# Patient Record
Sex: FEMALE | Race: BLACK OR AFRICAN AMERICAN | NOT HISPANIC OR LATINO | Employment: FULL TIME | URBAN - METROPOLITAN AREA
[De-identification: names, ages, dates, MRNs, and addresses within clinical notes are randomized per-mention and may not be internally consistent; named-entity substitution may affect disease eponyms.]

---

## 2019-06-20 PROBLEM — N87.1 DYSPLASIA OF CERVIX, HIGH GRADE CIN 2: Status: ACTIVE | Noted: 2018-06-18

## 2019-10-16 PROBLEM — Z79.899 HIGH RISK MEDICATION USE: Status: ACTIVE | Noted: 2019-10-16

## 2019-10-16 PROBLEM — R73.9 HYPERGLYCEMIA: Status: ACTIVE | Noted: 2019-10-16

## 2020-01-14 PROBLEM — M05.9 RHEUMATOID ARTHRITIS WITH POSITIVE RHEUMATOID FACTOR: Status: ACTIVE | Noted: 2020-01-14

## 2020-05-01 PROBLEM — E11.65 TYPE 2 DIABETES MELLITUS WITH HYPERGLYCEMIA, WITHOUT LONG-TERM CURRENT USE OF INSULIN: Status: ACTIVE | Noted: 2020-05-01

## 2021-05-12 ENCOUNTER — PATIENT MESSAGE (OUTPATIENT)
Dept: RESEARCH | Facility: HOSPITAL | Age: 56
End: 2021-05-12

## 2021-06-22 PROBLEM — E11.9 WELL CONTROLLED DIABETES MELLITUS: Chronic | Status: ACTIVE | Noted: 2021-06-22

## 2021-06-22 PROBLEM — I10 ESSENTIAL HYPERTENSION: Chronic | Status: ACTIVE | Noted: 2021-06-22

## 2021-06-22 PROBLEM — E78.5 HYPERLIPIDEMIA: Chronic | Status: ACTIVE | Noted: 2021-06-22

## 2021-09-27 PROBLEM — Z20.822 SUSPECTED COVID-19 VIRUS INFECTION: Status: ACTIVE | Noted: 2021-09-27

## 2021-09-27 PROBLEM — A08.39 GASTROENTERITIS DUE TO COVID-19 VIRUS: Status: ACTIVE | Noted: 2021-09-27

## 2021-09-27 PROBLEM — J96.01 ACUTE HYPOXEMIC RESPIRATORY FAILURE: Status: ACTIVE | Noted: 2021-09-27

## 2021-09-27 PROBLEM — U07.1 GASTROENTERITIS DUE TO COVID-19 VIRUS: Status: ACTIVE | Noted: 2021-09-27

## 2021-09-27 PROBLEM — N17.9 AKI (ACUTE KIDNEY INJURY): Status: ACTIVE | Noted: 2021-09-27

## 2021-09-28 PROBLEM — I95.9 HYPOTENSION: Status: ACTIVE | Noted: 2021-09-28

## 2021-09-29 ENCOUNTER — PATIENT MESSAGE (OUTPATIENT)
Dept: ADMINISTRATIVE | Facility: CLINIC | Age: 56
End: 2021-09-29

## 2021-09-30 ENCOUNTER — NURSE TRIAGE (OUTPATIENT)
Dept: ADMINISTRATIVE | Facility: CLINIC | Age: 56
End: 2021-09-30

## 2021-10-02 ENCOUNTER — NURSE TRIAGE (OUTPATIENT)
Dept: ADMINISTRATIVE | Facility: CLINIC | Age: 56
End: 2021-10-02

## 2021-10-08 PROBLEM — E04.2 MULTIPLE THYROID NODULES: Status: ACTIVE | Noted: 2021-10-08

## 2021-10-08 PROBLEM — U07.1 ACUTE HYPOXEMIC RESPIRATORY FAILURE DUE TO COVID-19: Status: ACTIVE | Noted: 2021-09-27

## 2021-10-08 PROBLEM — U07.1 COVID-19: Status: ACTIVE | Noted: 2021-10-08

## 2021-10-09 PROBLEM — R07.81 PLEURITIC PAIN: Status: ACTIVE | Noted: 2021-10-09

## 2021-10-09 PROBLEM — R07.81 PLEURITIC PAIN: Status: RESOLVED | Noted: 2021-10-09 | Resolved: 2021-10-09

## 2021-10-11 PROBLEM — U07.1 COVID-19: Status: ACTIVE | Noted: 2021-10-11

## 2022-01-29 DIAGNOSIS — D84.9 IMMUNOSUPPRESSED STATUS: ICD-10-CM

## 2022-02-04 DIAGNOSIS — D84.9 IMMUNOSUPPRESSED STATUS: ICD-10-CM

## 2022-02-06 DIAGNOSIS — D84.9 IMMUNOSUPPRESSED STATUS: ICD-10-CM

## 2022-04-03 PROBLEM — H40.1121 PRIMARY OPEN ANGLE GLAUCOMA (POAG) OF LEFT EYE, MILD STAGE: Status: ACTIVE | Noted: 2022-04-03

## 2022-04-03 PROBLEM — H25.813 COMBINED FORM OF AGE-RELATED CATARACT, BOTH EYES: Status: ACTIVE | Noted: 2022-04-03

## 2022-04-03 PROBLEM — E11.9 TYPE 2 DIABETES MELLITUS WITHOUT COMPLICATION, WITHOUT LONG-TERM CURRENT USE OF INSULIN: Status: ACTIVE | Noted: 2020-05-01

## 2022-04-03 PROBLEM — H40.1122 PRIMARY OPEN ANGLE GLAUCOMA (POAG) OF LEFT EYE, MODERATE STAGE: Status: ACTIVE | Noted: 2022-04-03

## 2022-12-15 PROBLEM — S93.491A SPRAIN OF ANTERIOR TALOFIBULAR LIGAMENT OF RIGHT ANKLE: Status: ACTIVE | Noted: 2022-12-15

## 2023-01-11 DIAGNOSIS — E11.9 TYPE 2 DIABETES MELLITUS WITHOUT COMPLICATION: ICD-10-CM

## 2023-01-31 ENCOUNTER — PATIENT OUTREACH (OUTPATIENT)
Dept: ADMINISTRATIVE | Facility: HOSPITAL | Age: 58
End: 2023-01-31

## 2023-01-31 DIAGNOSIS — E11.9 WELL CONTROLLED DIABETES MELLITUS: Primary | Chronic | ICD-10-CM

## 2024-05-15 ENCOUNTER — TELEPHONE (OUTPATIENT)
Dept: PHARMACY | Facility: CLINIC | Age: 59
End: 2024-05-15

## 2024-08-07 ENCOUNTER — PATIENT OUTREACH (OUTPATIENT)
Dept: ADMINISTRATIVE | Facility: HOSPITAL | Age: 59
End: 2024-08-07

## 2024-08-07 DIAGNOSIS — Z12.31 BREAST CANCER SCREENING BY MAMMOGRAM: Primary | ICD-10-CM

## 2024-08-07 DIAGNOSIS — E11.9 TYPE 2 DIABETES MELLITUS WITHOUT COMPLICATION, WITHOUT LONG-TERM CURRENT USE OF INSULIN: ICD-10-CM

## 2024-08-08 PROBLEM — A08.39 GASTROENTERITIS DUE TO COVID-19 VIRUS: Status: RESOLVED | Noted: 2021-09-27 | Resolved: 2024-08-08

## 2024-08-08 PROBLEM — L30.9 DERMATITIS: Status: ACTIVE | Noted: 2024-08-08

## 2024-08-08 PROBLEM — I95.9 HYPOTENSION: Status: RESOLVED | Noted: 2021-09-28 | Resolved: 2024-08-08

## 2024-08-08 PROBLEM — N17.9 AKI (ACUTE KIDNEY INJURY): Status: RESOLVED | Noted: 2021-09-27 | Resolved: 2024-08-08

## 2024-08-08 PROBLEM — U07.1 GASTROENTERITIS DUE TO COVID-19 VIRUS: Status: RESOLVED | Noted: 2021-09-27 | Resolved: 2024-08-08

## 2024-08-08 PROBLEM — E11.9 WELL CONTROLLED DIABETES MELLITUS: Chronic | Status: RESOLVED | Noted: 2021-06-22 | Resolved: 2024-08-08

## 2024-08-08 PROBLEM — U07.1 COVID: Status: RESOLVED | Noted: 2021-10-11 | Resolved: 2024-08-08

## 2024-08-08 PROBLEM — R73.9 HYPERGLYCEMIA: Status: RESOLVED | Noted: 2019-10-16 | Resolved: 2024-08-08

## 2024-08-20 PROBLEM — H40.1111 PRIMARY OPEN ANGLE GLAUCOMA (POAG) OF RIGHT EYE, MILD STAGE: Status: ACTIVE | Noted: 2024-08-20

## 2024-11-08 ENCOUNTER — TELEPHONE (OUTPATIENT)
Dept: PHARMACY | Facility: CLINIC | Age: 59
End: 2024-11-08

## 2024-11-08 NOTE — TELEPHONE ENCOUNTER
[facility]/Population Health Chart Review & Patient Outreach Details For Medication Adherence Project    Reason for Outreach Encounter: 3rd Party payor non-compliance report ([employer], [employer], [employer], etc)  2.  Patient Outreach Method: Reviewed patient chart   3.   Medication in question: rosuvastatin 20 mg     rosuvastatin last filled 11/6/24 for 30 day supply    4.  Reviewed and or Updates Made To: Patient Chart  5. Outreach Outcomes and/or actions taken: Patient filled medication and is on track to be adherent  Additional Notes:

## 2025-02-16 ENCOUNTER — TELEPHONE (OUTPATIENT)
Dept: PHARMACY | Facility: CLINIC | Age: 60
End: 2025-02-16

## 2025-03-01 ENCOUNTER — TELEPHONE (OUTPATIENT)
Dept: PHARMACY | Facility: CLINIC | Age: 60
End: 2025-03-01

## 2025-03-01 NOTE — TELEPHONE ENCOUNTER
[facility]/Population Health Chart Review & Patient Outreach Details For Medication Adherence Project    Reason for Outreach Encounter: 3rd Party payor non-compliance report ([employer], [employer], C, etc)  2.  Patient Outreach Method: Reviewed patient chart   3.   Medication in question:    Diabetes Medications              metFORMIN (GLUCOPHAGE-XR) 500 MG ER 24hr tablet Take 2 tablets (1,000 mg total) by mouth 2 (two) times daily with meals.                 Metformin  last filled  9/10/24 for 90 day supply      4.  Reviewed and or Updates Made To: Patient Chart  5. Outreach Outcomes and/or actions taken: Sent inquiry to patient: Waiting for response  Additional Notes:

## 2025-03-18 PROBLEM — E11.9 DIABETES MELLITUS TYPE 2 WITHOUT RETINOPATHY: Status: ACTIVE | Noted: 2025-03-18

## 2025-03-24 ENCOUNTER — TELEPHONE (OUTPATIENT)
Dept: PHARMACY | Facility: CLINIC | Age: 60
End: 2025-03-24

## 2025-03-25 NOTE — TELEPHONE ENCOUNTER
[facility]/Population Health Chart Review & Patient Outreach Details For Medication Adherence Project    Reason for Outreach Encounter: 3rd Party payor non-compliance report ([employer], [employer], [employer], etc)  2.  Patient Outreach Method: Reviewed patient chart   3.   Medication in question:    Hyperlipidemia Medications              rosuvastatin (CRESTOR) 20 MG tablet Take 1 tablet (20 mg total) by mouth once daily.                  LF 30 ds 3/6/25    4.  Reviewed and or Updates Made To: Patient Chart  5. Outreach Outcomes and/or actions taken: Patient filled medication and is on track to be adherent  Additional Notes:

## 2025-04-22 ENCOUNTER — TELEPHONE (OUTPATIENT)
Dept: PHARMACY | Facility: CLINIC | Age: 60
End: 2025-04-22

## 2025-04-22 NOTE — TELEPHONE ENCOUNTER
[facility]/Population Health Chart Review & Patient Outreach Details For Medication Adherence Project    Reason for Outreach Encounter: 3rd Party payor non-compliance report ([employer], [employer], C, etc)  2.  Patient Outreach Method: Reviewed patient chart  and [employer] message  3.   Medication in question:    Diabetes Medications              metFORMIN (GLUCOPHAGE-XR) 500 MG ER 24hr tablet Take 2 tablets (1,000 mg total) by mouth 2 (two) times daily with meals.                 LF 90 ds 9/10/24    4.  Reviewed and or Updates Made To: Patient Chart  5. Outreach Outcomes and/or actions taken: Sent inquiry to patient: Waiting for response  Additional Notes:

## 2025-04-28 ENCOUNTER — TELEPHONE (OUTPATIENT)
Dept: PHARMACY | Facility: CLINIC | Age: 60
End: 2025-04-28

## 2025-04-28 NOTE — TELEPHONE ENCOUNTER
[facility]/Population Health Chart Review & Patient Outreach Details For Medication Adherence Project    Reason for Outreach Encounter: 3rd Party payor non-compliance report ([employer], [employer], [employer], etc)  2.  Patient Outreach Method: Reviewed patient chart   3.   Medication in question:    Hyperlipidemia Medications              rosuvastatin (CRESTOR) 20 MG tablet Take 1 tablet (20 mg total) by mouth once daily.                    last filled  4/17/25 for 30 day supply      4.  Reviewed and or Updates Made To: Patient Chart  5. Outreach Outcomes and/or actions taken: Patient filled medication and is on track to be adherent  Additional Notes:

## 2025-06-26 ENCOUNTER — TELEPHONE (OUTPATIENT)
Dept: PHARMACY | Facility: CLINIC | Age: 60
End: 2025-06-26

## 2025-06-26 NOTE — TELEPHONE ENCOUNTER
[facility]/Population Health Chart Review & Patient Outreach Details For Medication Adherence Project    Reason for Outreach Encounter: 3rd Party payor non-compliance report ([employer], [employer], [employer], etc)  2.  Patient Outreach Method: Reviewed patient chart   3.   Medication in question:    Hyperlipidemia Medications              rosuvastatin (CRESTOR) 20 MG tablet Take 1 tablet (20 mg total) by mouth once daily.                  rosuvastatin   last filled  5/29/25 for 30 day supply    4.  Reviewed and or Updates Made To: Patient Chart  5. Outreach Outcomes and/or actions taken: Patient filled medication and is on track to be adherent  Additional Notes:

## 2025-08-21 ENCOUNTER — TELEPHONE (OUTPATIENT)
Dept: PHARMACY | Facility: CLINIC | Age: 60
End: 2025-08-21